# Patient Record
Sex: MALE | Race: WHITE | Employment: FULL TIME | URBAN - NONMETROPOLITAN AREA
[De-identification: names, ages, dates, MRNs, and addresses within clinical notes are randomized per-mention and may not be internally consistent; named-entity substitution may affect disease eponyms.]

---
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
## 2023-09-01 ENCOUNTER — ANESTHESIA (OUTPATIENT)
Dept: PERIOP | Facility: HOSPITAL | Age: 23
DRG: 343 | End: 2023-09-01
Payer: COMMERCIAL

## 2023-09-01 ENCOUNTER — HOSPITAL ENCOUNTER (INPATIENT)
Facility: HOSPITAL | Age: 23
LOS: 1 days | Discharge: HOME | DRG: 343 | End: 2023-09-02
Attending: SURGERY | Admitting: SURGERY
Payer: COMMERCIAL

## 2023-09-01 ENCOUNTER — ANESTHESIA EVENT (OUTPATIENT)
Dept: PERIOP | Facility: HOSPITAL | Age: 23
DRG: 343 | End: 2023-09-01
Payer: COMMERCIAL

## 2023-09-01 ENCOUNTER — APPOINTMENT (OUTPATIENT)
Dept: OTHER | Facility: HOSPITAL | Age: 23
DRG: 343 | End: 2023-09-01
Payer: COMMERCIAL

## 2023-09-01 DIAGNOSIS — K35.30 ACUTE APPENDICITIS WITH LOCALIZED PERITONITIS, UNSPECIFIED WHETHER ABSCESS PRESENT, UNSPECIFIED WHETHER GANGRENE PRESENT, UNSPECIFIED WHETHER PERFORATION PRESENT: Primary | ICD-10-CM

## 2023-09-01 DIAGNOSIS — K35.30 ACUTE APPENDICITIS WITH LOCALIZED PERITONITIS, WITHOUT PERFORATION, ABSCESS, OR GANGRENE: ICD-10-CM

## 2023-09-01 LAB
DEPRECATED RDW RBC AUTO: 40 FL (ref 37–54)
ERYTHROCYTE [DISTWIDTH] IN BLOOD BY AUTOMATED COUNT: 12.1 % (ref 12.3–15.4)
HCT VFR BLD AUTO: 50.8 % (ref 37.5–51)
HGB BLD-MCNC: 16.9 G/DL (ref 13–17.7)
MCH RBC QN AUTO: 29.9 PG (ref 26.6–33)
MCHC RBC AUTO-ENTMCNC: 33.3 G/DL (ref 31.5–35.7)
MCV RBC AUTO: 89.9 FL (ref 79–97)
PLATELET # BLD AUTO: 274 10*3/MM3 (ref 140–450)
PMV BLD AUTO: 9.5 FL (ref 6–12)
RBC # BLD AUTO: 5.65 10*6/MM3 (ref 4.14–5.8)
WBC NRBC COR # BLD: 8.2 10*3/MM3 (ref 3.4–10.8)

## 2023-09-01 PROCEDURE — 25010000002 HYDROMORPHONE PER 4 MG: Performed by: ANESTHESIOLOGY

## 2023-09-01 PROCEDURE — 25010000002 FENTANYL CITRATE (PF) 250 MCG/5ML SOLUTION: Performed by: NURSE ANESTHETIST, CERTIFIED REGISTERED

## 2023-09-01 PROCEDURE — 25010000002 PIPERACILLIN SOD-TAZOBACTAM PER 1 G: Performed by: SURGERY

## 2023-09-01 PROCEDURE — 44970 LAPAROSCOPY APPENDECTOMY: CPT | Performed by: STUDENT IN AN ORGANIZED HEALTH CARE EDUCATION/TRAINING PROGRAM

## 2023-09-01 PROCEDURE — 25010000002 DEXAMETHASONE PER 1 MG: Performed by: NURSE ANESTHETIST, CERTIFIED REGISTERED

## 2023-09-01 PROCEDURE — 99223 1ST HOSP IP/OBS HIGH 75: CPT | Performed by: STUDENT IN AN ORGANIZED HEALTH CARE EDUCATION/TRAINING PROGRAM

## 2023-09-01 PROCEDURE — 88304 TISSUE EXAM BY PATHOLOGIST: CPT | Performed by: SURGERY

## 2023-09-01 PROCEDURE — 25010000002 DROPERIDOL PER 5 MG: Performed by: ANESTHESIOLOGY

## 2023-09-01 PROCEDURE — 25010000002 HYDROMORPHONE 1 MG/ML SOLUTION: Performed by: NURSE ANESTHETIST, CERTIFIED REGISTERED

## 2023-09-01 PROCEDURE — 25010000002 PROPOFOL 10 MG/ML EMULSION: Performed by: NURSE ANESTHETIST, CERTIFIED REGISTERED

## 2023-09-01 PROCEDURE — 25010000002 ONDANSETRON PER 1 MG: Performed by: NURSE ANESTHETIST, CERTIFIED REGISTERED

## 2023-09-01 PROCEDURE — 25010000002 FENTANYL CITRATE (PF) 50 MCG/ML SOLUTION: Performed by: ANESTHESIOLOGY

## 2023-09-01 PROCEDURE — 25010000002 ENOXAPARIN PER 10 MG: Performed by: SURGERY

## 2023-09-01 PROCEDURE — 85027 COMPLETE CBC AUTOMATED: CPT | Performed by: SURGERY

## 2023-09-01 PROCEDURE — 0DTJ4ZZ RESECTION OF APPENDIX, PERCUTANEOUS ENDOSCOPIC APPROACH: ICD-10-PCS | Performed by: SURGERY

## 2023-09-01 DEVICE — THE ECHELON, ECHELON ENDOPATH™ AND ECHELON FLEX™ FAMILIES OF ENDOSCOPIC LINEAR CUTTERS AND RELOADS ARE STERILE, SINGLE PATIENT USE INSTRUMENTS THAT SIMULTANEOUSLY CUT AND STAPLE TISSUE. THERE ARE SIX STAGGERED ROWS OF STAPLES, THREE ON EITHER SIDE OF THE CUT LINE. THE 45 MM INSTRUMENTS HAVE A STAPLE LINE THATIS APPROXIMATELY 45 MM LONG AND A CUT LINE THAT IS APPROXIMATELY 42 MM LONG. THE SHAFT CAN ROTATE FREELY IN BOTH DIRECTIONS AND AN ARTICULATION MECHANISM ON ARTICULATING INSTRUMENTS ENABLES BENDING THE DISTAL PORTIONOF THE SHAFT TO FACILITATE LATERAL ACCESS OF THE OPERATIVE SITE.THE INSTRUMENTS ARE SHIPPED WITHOUT A RELOAD AND MUST BE LOADED PRIOR TO USE. A STAPLE RETAINING CAP ON THE RELOAD PROTECTS THE STAPLE LEG POINTS DURING SHIPPING AND TRANSPORTATION. THE INSTRUMENTS’ LOCK-OUT FEATURE IS DESIGNED TO PREVENT A USED RELOAD FROM BEING REFIRED.
Type: IMPLANTABLE DEVICE | Site: ABDOMEN | Status: FUNCTIONAL
Brand: ECHELON ENDOPATH

## 2023-09-01 RX ORDER — SODIUM CHLORIDE 0.9 % (FLUSH) 0.9 %
10 SYRINGE (ML) INJECTION EVERY 12 HOURS SCHEDULED
Status: DISCONTINUED | OUTPATIENT
Start: 2023-09-01 | End: 2023-09-02 | Stop reason: HOSPADM

## 2023-09-01 RX ORDER — BISACODYL 10 MG
10 SUPPOSITORY, RECTAL RECTAL DAILY PRN
Status: DISCONTINUED | OUTPATIENT
Start: 2023-09-01 | End: 2023-09-02 | Stop reason: HOSPADM

## 2023-09-01 RX ORDER — SODIUM CHLORIDE 9 MG/ML
40 INJECTION, SOLUTION INTRAVENOUS AS NEEDED
Status: DISCONTINUED | OUTPATIENT
Start: 2023-09-01 | End: 2023-09-02 | Stop reason: HOSPADM

## 2023-09-01 RX ORDER — SODIUM CHLORIDE 0.9 % (FLUSH) 0.9 %
10 SYRINGE (ML) INJECTION AS NEEDED
Status: DISCONTINUED | OUTPATIENT
Start: 2023-09-01 | End: 2023-09-02 | Stop reason: HOSPADM

## 2023-09-01 RX ORDER — PROPOFOL 10 MG/ML
VIAL (ML) INTRAVENOUS AS NEEDED
Status: DISCONTINUED | OUTPATIENT
Start: 2023-09-01 | End: 2023-09-01 | Stop reason: SURG

## 2023-09-01 RX ORDER — SODIUM CHLORIDE, SODIUM LACTATE, POTASSIUM CHLORIDE, CALCIUM CHLORIDE 600; 310; 30; 20 MG/100ML; MG/100ML; MG/100ML; MG/100ML
INJECTION, SOLUTION INTRAVENOUS CONTINUOUS PRN
Status: DISCONTINUED | OUTPATIENT
Start: 2023-09-01 | End: 2023-09-01 | Stop reason: SURG

## 2023-09-01 RX ORDER — ACETAMINOPHEN 325 MG/1
650 TABLET ORAL EVERY 6 HOURS
Status: DISCONTINUED | OUTPATIENT
Start: 2023-09-01 | End: 2023-09-02 | Stop reason: HOSPADM

## 2023-09-01 RX ORDER — NEOSTIGMINE METHYLSULFATE 5 MG/5 ML
SYRINGE (ML) INTRAVENOUS AS NEEDED
Status: DISCONTINUED | OUTPATIENT
Start: 2023-09-01 | End: 2023-09-01 | Stop reason: SURG

## 2023-09-01 RX ORDER — SODIUM CHLORIDE, SODIUM LACTATE, POTASSIUM CHLORIDE, CALCIUM CHLORIDE 600; 310; 30; 20 MG/100ML; MG/100ML; MG/100ML; MG/100ML
100 INJECTION, SOLUTION INTRAVENOUS CONTINUOUS
Status: CANCELLED | OUTPATIENT
Start: 2023-09-01

## 2023-09-01 RX ORDER — BISACODYL 5 MG/1
5 TABLET, DELAYED RELEASE ORAL DAILY PRN
Status: DISCONTINUED | OUTPATIENT
Start: 2023-09-01 | End: 2023-09-02 | Stop reason: HOSPADM

## 2023-09-01 RX ORDER — SODIUM CHLORIDE 9 MG/ML
40 INJECTION, SOLUTION INTRAVENOUS AS NEEDED
Status: CANCELLED | OUTPATIENT
Start: 2023-09-01

## 2023-09-01 RX ORDER — MAGNESIUM HYDROXIDE 1200 MG/15ML
LIQUID ORAL AS NEEDED
Status: DISCONTINUED | OUTPATIENT
Start: 2023-09-01 | End: 2023-09-01 | Stop reason: HOSPADM

## 2023-09-01 RX ORDER — ENOXAPARIN SODIUM 100 MG/ML
40 INJECTION SUBCUTANEOUS DAILY
Status: DISCONTINUED | OUTPATIENT
Start: 2023-09-01 | End: 2023-09-02 | Stop reason: HOSPADM

## 2023-09-01 RX ORDER — LABETALOL HYDROCHLORIDE 5 MG/ML
5 INJECTION, SOLUTION INTRAVENOUS
Status: DISCONTINUED | OUTPATIENT
Start: 2023-09-01 | End: 2023-09-01 | Stop reason: HOSPADM

## 2023-09-01 RX ORDER — DROPERIDOL 2.5 MG/ML
0.62 INJECTION, SOLUTION INTRAMUSCULAR; INTRAVENOUS ONCE AS NEEDED
Status: COMPLETED | OUTPATIENT
Start: 2023-09-01 | End: 2023-09-01

## 2023-09-01 RX ORDER — SODIUM CHLORIDE 0.9 % (FLUSH) 0.9 %
3-10 SYRINGE (ML) INJECTION AS NEEDED
Status: CANCELLED | OUTPATIENT
Start: 2023-09-01

## 2023-09-01 RX ORDER — LIDOCAINE HYDROCHLORIDE 40 MG/ML
SOLUTION TOPICAL AS NEEDED
Status: DISCONTINUED | OUTPATIENT
Start: 2023-09-01 | End: 2023-09-01 | Stop reason: SURG

## 2023-09-01 RX ORDER — BUPIVACAINE HYDROCHLORIDE AND EPINEPHRINE 5; 5 MG/ML; UG/ML
INJECTION, SOLUTION PERINEURAL AS NEEDED
Status: DISCONTINUED | OUTPATIENT
Start: 2023-09-01 | End: 2023-09-01 | Stop reason: HOSPADM

## 2023-09-01 RX ORDER — SODIUM CHLORIDE 0.9 % (FLUSH) 0.9 %
3 SYRINGE (ML) INJECTION EVERY 12 HOURS SCHEDULED
Status: CANCELLED | OUTPATIENT
Start: 2023-09-01

## 2023-09-01 RX ORDER — FENTANYL CITRATE 50 UG/ML
25 INJECTION, SOLUTION INTRAMUSCULAR; INTRAVENOUS
Status: DISCONTINUED | OUTPATIENT
Start: 2023-09-01 | End: 2023-09-01 | Stop reason: HOSPADM

## 2023-09-01 RX ORDER — HYDROMORPHONE HYDROCHLORIDE 1 MG/ML
0.5 INJECTION, SOLUTION INTRAMUSCULAR; INTRAVENOUS; SUBCUTANEOUS
Status: DISCONTINUED | OUTPATIENT
Start: 2023-09-01 | End: 2023-09-01 | Stop reason: HOSPADM

## 2023-09-01 RX ORDER — POLYETHYLENE GLYCOL 3350 17 G/17G
17 POWDER, FOR SOLUTION ORAL DAILY PRN
Status: DISCONTINUED | OUTPATIENT
Start: 2023-09-01 | End: 2023-09-02 | Stop reason: HOSPADM

## 2023-09-01 RX ORDER — DEXTROSE, SODIUM CHLORIDE, SODIUM LACTATE, POTASSIUM CHLORIDE, AND CALCIUM CHLORIDE 5; .6; .31; .03; .02 G/100ML; G/100ML; G/100ML; G/100ML; G/100ML
50 INJECTION, SOLUTION INTRAVENOUS CONTINUOUS
Status: DISCONTINUED | OUTPATIENT
Start: 2023-09-01 | End: 2023-09-02

## 2023-09-01 RX ORDER — DEXAMETHASONE SODIUM PHOSPHATE 4 MG/ML
INJECTION, SOLUTION INTRA-ARTICULAR; INTRALESIONAL; INTRAMUSCULAR; INTRAVENOUS; SOFT TISSUE AS NEEDED
Status: DISCONTINUED | OUTPATIENT
Start: 2023-09-01 | End: 2023-09-01 | Stop reason: SURG

## 2023-09-01 RX ORDER — ONDANSETRON 2 MG/ML
INJECTION INTRAMUSCULAR; INTRAVENOUS AS NEEDED
Status: DISCONTINUED | OUTPATIENT
Start: 2023-09-01 | End: 2023-09-01 | Stop reason: SURG

## 2023-09-01 RX ORDER — ONDANSETRON 2 MG/ML
4 INJECTION INTRAMUSCULAR; INTRAVENOUS EVERY 6 HOURS PRN
Status: DISCONTINUED | OUTPATIENT
Start: 2023-09-01 | End: 2023-09-02

## 2023-09-01 RX ORDER — AMOXICILLIN 250 MG
2 CAPSULE ORAL 2 TIMES DAILY
Status: DISCONTINUED | OUTPATIENT
Start: 2023-09-01 | End: 2023-09-02 | Stop reason: HOSPADM

## 2023-09-01 RX ORDER — TRAMADOL HYDROCHLORIDE 50 MG/1
50 TABLET ORAL EVERY 6 HOURS PRN
Status: DISCONTINUED | OUTPATIENT
Start: 2023-09-01 | End: 2023-09-02 | Stop reason: HOSPADM

## 2023-09-01 RX ORDER — OXYCODONE AND ACETAMINOPHEN 10; 325 MG/1; MG/1
1 TABLET ORAL ONCE AS NEEDED
Status: COMPLETED | OUTPATIENT
Start: 2023-09-01 | End: 2023-09-01

## 2023-09-01 RX ORDER — ONDANSETRON 2 MG/ML
4 INJECTION INTRAMUSCULAR; INTRAVENOUS
Status: DISCONTINUED | OUTPATIENT
Start: 2023-09-01 | End: 2023-09-01 | Stop reason: HOSPADM

## 2023-09-01 RX ORDER — NALOXONE HCL 0.4 MG/ML
0.04 VIAL (ML) INJECTION AS NEEDED
Status: DISCONTINUED | OUTPATIENT
Start: 2023-09-01 | End: 2023-09-01 | Stop reason: HOSPADM

## 2023-09-01 RX ORDER — LIDOCAINE HYDROCHLORIDE 20 MG/ML
INJECTION, SOLUTION EPIDURAL; INFILTRATION; INTRACAUDAL; PERINEURAL AS NEEDED
Status: DISCONTINUED | OUTPATIENT
Start: 2023-09-01 | End: 2023-09-01 | Stop reason: SURG

## 2023-09-01 RX ORDER — FLUMAZENIL 0.1 MG/ML
0.2 INJECTION INTRAVENOUS AS NEEDED
Status: DISCONTINUED | OUTPATIENT
Start: 2023-09-01 | End: 2023-09-01 | Stop reason: HOSPADM

## 2023-09-01 RX ORDER — ACETAMINOPHEN 500 MG
1000 TABLET ORAL ONCE
Status: CANCELLED | OUTPATIENT
Start: 2023-09-01 | End: 2023-09-01

## 2023-09-01 RX ORDER — MIDAZOLAM HYDROCHLORIDE 1 MG/ML
1 INJECTION INTRAMUSCULAR; INTRAVENOUS
Status: CANCELLED | OUTPATIENT
Start: 2023-09-01

## 2023-09-01 RX ORDER — SODIUM CHLORIDE 9 MG/ML
INJECTION, SOLUTION INTRAVENOUS AS NEEDED
Status: DISCONTINUED | OUTPATIENT
Start: 2023-09-01 | End: 2023-09-01 | Stop reason: HOSPADM

## 2023-09-01 RX ORDER — ROCURONIUM BROMIDE 10 MG/ML
INJECTION, SOLUTION INTRAVENOUS AS NEEDED
Status: DISCONTINUED | OUTPATIENT
Start: 2023-09-01 | End: 2023-09-01 | Stop reason: SURG

## 2023-09-01 RX ORDER — FENTANYL CITRATE 50 UG/ML
INJECTION, SOLUTION INTRAMUSCULAR; INTRAVENOUS AS NEEDED
Status: DISCONTINUED | OUTPATIENT
Start: 2023-09-01 | End: 2023-09-01 | Stop reason: SURG

## 2023-09-01 RX ADMIN — SODIUM CHLORIDE, SODIUM LACTATE, POTASSIUM CHLORIDE, CALCIUM CHLORIDE AND DEXTROSE MONOHYDRATE 75 ML/HR: 5; 600; 310; 30; 20 INJECTION, SOLUTION INTRAVENOUS at 18:28

## 2023-09-01 RX ADMIN — GLYCOPYRROLATE 0.4 MG: 0.2 INJECTION INTRAMUSCULAR; INTRAVENOUS at 21:10

## 2023-09-01 RX ADMIN — SODIUM CHLORIDE, POTASSIUM CHLORIDE, SODIUM LACTATE AND CALCIUM CHLORIDE: 600; 310; 30; 20 INJECTION, SOLUTION INTRAVENOUS at 21:05

## 2023-09-01 RX ADMIN — FENTANYL CITRATE 25 MCG: 50 INJECTION, SOLUTION INTRAMUSCULAR; INTRAVENOUS at 21:58

## 2023-09-01 RX ADMIN — DROPERIDOL 0.62 MG: 2.5 INJECTION, SOLUTION INTRAMUSCULAR; INTRAVENOUS at 21:54

## 2023-09-01 RX ADMIN — HYDROMORPHONE HYDROCHLORIDE 0.5 MG: 1 INJECTION, SOLUTION INTRAMUSCULAR; INTRAVENOUS; SUBCUTANEOUS at 21:52

## 2023-09-01 RX ADMIN — FENTANYL CITRATE 100 MCG: 0.05 INJECTION, SOLUTION INTRAMUSCULAR; INTRAVENOUS at 20:29

## 2023-09-01 RX ADMIN — SODIUM CHLORIDE, POTASSIUM CHLORIDE, SODIUM LACTATE AND CALCIUM CHLORIDE: 600; 310; 30; 20 INJECTION, SOLUTION INTRAVENOUS at 20:11

## 2023-09-01 RX ADMIN — ROCURONIUM BROMIDE 50 MG: 10 INJECTION INTRAVENOUS at 20:14

## 2023-09-01 RX ADMIN — DEXAMETHASONE SODIUM PHOSPHATE 8 MG: 4 INJECTION, SOLUTION INTRA-ARTICULAR; INTRALESIONAL; INTRAMUSCULAR; INTRAVENOUS; SOFT TISSUE at 20:22

## 2023-09-01 RX ADMIN — FENTANYL CITRATE 25 MCG: 50 INJECTION, SOLUTION INTRAMUSCULAR; INTRAVENOUS at 21:53

## 2023-09-01 RX ADMIN — ONDANSETRON 4 MG: 2 INJECTION INTRAMUSCULAR; INTRAVENOUS at 20:56

## 2023-09-01 RX ADMIN — HYDROMORPHONE HYDROCHLORIDE 1 MG: 1 INJECTION, SOLUTION INTRAMUSCULAR; INTRAVENOUS; SUBCUTANEOUS at 21:15

## 2023-09-01 RX ADMIN — Medication 3 MG: at 21:10

## 2023-09-01 RX ADMIN — LIDOCAINE HYDROCHLORIDE 100 MG: 20 INJECTION, SOLUTION EPIDURAL; INFILTRATION; INTRACAUDAL; PERINEURAL at 20:14

## 2023-09-01 RX ADMIN — FENTANYL CITRATE 150 MCG: 0.05 INJECTION, SOLUTION INTRAMUSCULAR; INTRAVENOUS at 20:11

## 2023-09-01 RX ADMIN — PROPOFOL 200 MG: 10 INJECTION, EMULSION INTRAVENOUS at 20:14

## 2023-09-01 RX ADMIN — PIPERACILLIN SODIUM AND TAZOBACTAM SODIUM 3.38 G: 3; .375 INJECTION, SOLUTION INTRAVENOUS at 18:58

## 2023-09-01 RX ADMIN — ENOXAPARIN SODIUM 40 MG: 100 INJECTION SUBCUTANEOUS at 23:16

## 2023-09-01 RX ADMIN — LIDOCAINE HYDROCHLORIDE 1 EACH: 40 SOLUTION TOPICAL at 20:15

## 2023-09-01 RX ADMIN — OXYCODONE AND ACETAMINOPHEN 1 TABLET: 10; 325 TABLET ORAL at 22:00

## 2023-09-01 NOTE — PROGRESS NOTES
Pharmacy Recommendation  Antimicrobial Stewardship  Initiation     Assessment/Action/Plan:  Pharmacy to dose Zosyn for treatment of IAI. Therapy initiated at 3.375 g  every 8 hours (extended infusion) x 4 days.      Subjective:   Farrukh Simental is a 23 y.o. male currently being treated for IAI.    Objective:  No results found for: WBC  Temp Readings from Last 1 Encounters:   09/01/23 97.5 °F (36.4 °C) (Oral)     Culture Results:  Microbiology Results (last 10 days)       ** No results found for the last 240 hours. **          Current Antimicrobials:   Anti-Infectives (From admission, onward)      Ordered     Dose/Rate Route Frequency Start Stop    09/01/23 1834  piperacillin-tazobactam (ZOSYN) 3.375 g in iso-osmotic dextrose 50 ml (premix)        Ordering Provider: Yobany Bullard Jr., MD    3.375 g  over 4 Hours Intravenous Every 8 Hours 09/02/23 0130 09/06/23 0129    09/01/23 1834  piperacillin-tazobactam (ZOSYN) 3.375 g in iso-osmotic dextrose 50 ml (premix)        Ordering Provider: Yobany Bullard Jr., MD    3.375 g  over 30 Minutes Intravenous Once 09/01/23 1930      09/01/23 1832  Pharmacy to Dose Zosyn        Ordering Provider: Yobany Bullard Jr., MD     Does not apply Continuous PRN 09/01/23 1831 09/05/23 1830            Ricky Sullivan, [place]  09/01/23 18:38 CDT

## 2023-09-01 NOTE — PLAN OF CARE
Goal Outcome Evaluation:         Pt here with appendicitis.  Grandmother at bedside.  Transfer from [place]. IVF infusing. Pt oriented to room.

## 2023-09-02 VITALS
TEMPERATURE: 97.8 F | HEART RATE: 50 BPM | RESPIRATION RATE: 16 BRPM | HEIGHT: 72 IN | BODY MASS INDEX: 24.11 KG/M2 | DIASTOLIC BLOOD PRESSURE: 48 MMHG | SYSTOLIC BLOOD PRESSURE: 105 MMHG | WEIGHT: 178 LBS | OXYGEN SATURATION: 98 %

## 2023-09-02 PROCEDURE — 99024 POSTOP FOLLOW-UP VISIT: CPT | Performed by: STUDENT IN AN ORGANIZED HEALTH CARE EDUCATION/TRAINING PROGRAM

## 2023-09-02 PROCEDURE — 25010000002 ENOXAPARIN PER 10 MG: Performed by: SURGERY

## 2023-09-02 RX ORDER — TRAMADOL HYDROCHLORIDE 50 MG/1
50 TABLET ORAL EVERY 6 HOURS PRN
Qty: 12 TABLET | Refills: 0 | Status: SHIPPED | OUTPATIENT
Start: 2023-09-02 | End: 2023-09-08

## 2023-09-02 RX ADMIN — Medication 10 ML: at 08:38

## 2023-09-02 RX ADMIN — ACETAMINOPHEN 650 MG: 325 TABLET, FILM COATED ORAL at 06:08

## 2023-09-02 RX ADMIN — ACETAMINOPHEN 650 MG: 325 TABLET, FILM COATED ORAL at 00:44

## 2023-09-02 RX ADMIN — TRAMADOL HYDROCHLORIDE 50 MG: 50 TABLET, COATED ORAL at 03:40

## 2023-09-02 RX ADMIN — TRAMADOL HYDROCHLORIDE 50 MG: 50 TABLET, COATED ORAL at 09:57

## 2023-09-02 RX ADMIN — ENOXAPARIN SODIUM 40 MG: 100 INJECTION SUBCUTANEOUS at 08:38

## 2023-09-02 RX ADMIN — DOCUSATE SODIUM 50 MG AND SENNOSIDES 8.6 MG 2 TABLET: 8.6; 5 TABLET, FILM COATED ORAL at 08:38

## 2023-09-02 NOTE — PLAN OF CARE
Goal Outcome Evaluation:  Plan of Care Reviewed With: patient      Patient c/o abdominal pain, PRN pain med given. No c/o nausea. Lap sites x4 c/d/I with liquid skin adhesive. IVF infusing. Pt on room air, continuous pulse ox in use. A&O x4. Voiding per urinal. VSS.

## 2023-09-02 NOTE — ANESTHESIA PREPROCEDURE EVALUATION
Anesthesia Evaluation     Patient summary reviewed and Nursing notes reviewed   no history of anesthetic complications:   NPO Solid Status: > 8 hours             Airway   Mallampati: I  Dental      Pulmonary    (+) a smoker Current,  (-) sleep apnea, no home oxygen  Cardiovascular - negative cardio ROS        Neuro/Psych  (-) seizures, CVA  GI/Hepatic/Renal/Endo    (-) diabetes    Musculoskeletal     Abdominal    Substance History      OB/GYN          Other                        Anesthesia Plan    ASA 1 - emergent     general     intravenous induction     Anesthetic plan, risks, benefits, and alternatives have been provided, discussed and informed consent has been obtained with: patient.      CODE STATUS:    Level Of Support Discussed With: Patient; Health Care Surrogate  Code Status (Patient has no pulse and is not breathing): CPR (Attempt to Resuscitate)  Medical Interventions (Patient has pulse or is breathing): Full Support

## 2023-09-02 NOTE — H&P
Patient Name:  Farrukh Simental  YOB: 2000  [id number]    Date of Service: 9/1/2023       Patient Care Team:  Samuel Brady DO as PCP - General (Family Medicine)      General Surgery History and Physical    Date: 09/01/23    Chief Complaint : Acute appendicitis    Subjective      Patient is a 23 y.o. male who presents as a transfer from the referring clinic given concern for acute appendicitis.  23-year-old otherwise healthy and very active man presents with about a 1-1/2-day duration of vague abdominal pain with subsequent migration to the right lower quadrant.  He has associated symptoms of nausea, anorexia, some diarrhea, and dysuria.  He was seen by side clinic who obtained labs in which there was reportedly no leukocytosis but CT abdomen pelvis obtained was notable for acute appendicitis with some fluid in the pelvis.  He was then transferred out to [facility] for a laparoscopic appendectomy.    CT abdomen/pelvis personally reviewed and discussed with in-house radiologist: Patient has a acute appendicitis in the right lower quadrant with likely reactive fluid in the pelvis without evidence of obvious perforation.    Allergy: No Known Allergies    Medications:  acetaminophen, 650 mg, Oral, Q6H  enoxaparin, 40 mg, Subcutaneous, Daily  piperacillin-tazobactam, 3.375 g, Intravenous, Once  [START ON 9/2/2023] piperacillin-tazobactam, 3.375 g, Intravenous, Q8H  senna-docusate sodium, 2 tablet, Oral, BID  sodium chloride, 10 mL, Intravenous, Q12H      dextrose 5 % and lactated Ringer's, 75 mL/hr, Last Rate: 75 mL/hr (09/01/23 1828)  Pharmacy to Dose Zosyn,       No current facility-administered medications on file prior to encounter.     No current outpatient medications on file prior to encounter.       PMHx:   No past medical history on file.      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    Tobacco use: Denies, but vapes   EtOH use : Denies    Illicit drug use: Denies      Review of Systems   Pertinent items are noted in HPI     Constitutional: See HPI   Eyes: Denies visual changes    Cardiovascular: Denies chest pain, palpitations   Pulmonary: Denies cough or shortness of breath   Abdominal/ GI: See HPI    Genitourinary: Denies dysuria or hematuria   Musculoskeletal: Denies any but chronic joint aches, pains or deformities   Psychiatric: No recent mood changes   Neurologic: No paresthesias or loss of function          Objective     Physical Exam:      Vital Signs  /74 (BP Location: Right arm)   Pulse 85   Temp 97.5 °F (36.4 °C) (Oral)   Resp 18   Ht 182.9 cm (72\")   Wt 80.7 kg (178 lb)   SpO2 100%   BMI 24.14 kg/m²   No intake or output data in the 24 hours ending 09/01/23 1907      Physical Exam:    Head: Normocephalic, atraumatic.   Eyes: Pupils equal, round, react to light and accommodation.   Mouth: Oral mucosa without lesions,   Neck: No masses, lymphadenopathy or carotid bruits bilaterally   CV: Rhythm regular and rate regular, no murmurs, rubs or gallops  Lungs: Clear to auscultation bilaterally   Abdomen: Bowel sounds positive, soft, lower quadrant tenderness to palpation, positive Rovsing sign, scopic midline incisional scars  Groin : No obvious hernias bilaterally   Extremities:  No cyanosis, clubbing or edema bilaterally   Lymphatics: No abnormal lymphadenopathy appreciated   Neurologic: No gross deficits       Results Review:   I have personally reviewed all of the recent lab and imaging results available at this time   WBC 8.2       Assessment & Plan     Assessment and Plan:    Problem List Items Addressed This Visit          Gastrointestinal Abdominal     * (Principal) Acute appendicitis with localized peritonitis - Primary    Relevant Orders    Case request (Completed)        Active Hospital Problems "    Diagnosis  POA    **Acute appendicitis with localized peritonitis [K35.30]  Yes      Resolved Hospital Problems   No resolved problems to display.      23-year-old man with history of prior laparoscopic resection of gastric tumor at age is old, presenting with classic signs and symptoms of acute appendicitis without obvious signs of perforation    P.o. remains IV fluids  IV antibiotics  Multimodal pain control  2 OR for laparoscopic, possible open appendectomy  Risk, benefits, and alternatives were discussed with the patient.  He and his grandmother noted their understanding consent to proceed to the OR.    I discussed the patient's findings and my recommendations with the patient and/or family, as well as the primary team     Yobany Bullard Jr, MD  09/01/23  19:07 CDT

## 2023-09-02 NOTE — OP NOTE
OPERATIVE NOTE    Patient Name:  Farrukh Simental  YOB: 2000, [id number]    Date of Surgery:  9/1/2023    PREOPERATIVE DIAGNOSIS: Acute appendicitis with localized peritonitis, unspecified whether abscess present, unspecified whether gangrene present, unspecified whether perforation present [K35.30]    POSTOPERATIVE DIAGNOSIS: Same      PROCEDURE PERFORMED:   1] Laparoscopic appendectomy     SURGEON: Yobany Bullard Jr., MD MS    Circulator: Shasha Hodgson RN  Scrub Person: Essie Seymour  Assistant: Rebeca Strogn     SPECIMENS: Appendix     ANESTHESIA: General     FINDINGS:   1.  Acute noncomplicated appendicitis without evidence of gangrene or perforation     INDICATIONS: The patient is a 23 y.o. male who presented with 1.5-day duration of vague abdominal pain with migration to the right lower quadrant.  Work-up by a referring physician had found him to have an acute appendicitis with pelvic fluid.  There is concern for potential acute appendicitis with perforation so he is transferred to [facility].  Risk, benefits, and alternatives were discussed with the patient and his grandmother.  They noted their understanding and consent to proceed to the OR for laparoscopic, possible open, appendectomy.     DESCRIPTION OF PROCEDURE:    After obtaining informed consent, the patient was taken to the operating room and placed in supine position the right arm out and left arm tucked. After appropriate DVT and antibiotic prophylaxis, general anesthesia was induced.  A Trinidad was placed in sterilely. The abdomen was prepped and draped in standard sterile fashion.  Timeout was properly performed.    At Bowen's point, local anesthetic was given, followed by a small incision using 11 blade, we then placed a Veress needle successfully and a water drop test was performed and was noted to be satisfactorily.  Then insufflated the abdomen and the patient tolerated the insufflation  without issues.    A local anesthetic infraumbilically followed by incision of the skin with the 11 blade.  Using the Optiview technique, we gained access into the abdominal cavity.  Of note, it was difficult to perform the Optiview technique given the patient's strong fascia.  We viewed the underlying structures and no injuries were noted.  We evaluated Bowen's point and there is no iatrogenic injury with the placement of the Veress needle.  The Veress needle was removed.  Two 5 mm ports were then placed in the left lower quadrant and suprapubic area.  The infraumbilical port was then upsized from a 5 mm port to a 12 mm port.  The patient's right side was then elevated up.    We triangulated to the right lower quadrant.  Found the right colon and traced tinea down to the cecal base which we then found the appendix.  We mobilized the appendix up.  We dissected the base of the appendiceal mesoappendix in order to create a window.  We transected the appendiceal base using a blue 45 mm stapler load.  We then transected the mesoappendix using 2 white 45 mm stapler loads.  The appendix was then placed into an Endo Catch bag and removed through the infraumbilical port site.    We turned our attention back to the stable sites: Remnant appendiceal base was intact without evidence of leak, the remnant mesoappendix was noted to be hemostatic.  We placed the patient in Trendelenburg position.  We evaluated the pelvis colon and had reactionary fluid without any evidence of abscess.  We suctioned out the fluid.    Using the PMI suture passer, we closed the infraumbilical 12 mm port site fascia in a figure-of-eight fashion using 0 Vicryl suture.  The placed patient in supine position.  We removed the 5 mm port under direct laparoscopic vision.  We then desufflated the abdominal cavity.  We irrigated all of the incision sites.  We closed the infraumbilical incision in a 2 layer fashion using interrupted 3-0 Vicryl sutures and  running 3-0 Monocryl sutures.  We closed the 5 mm ports using 3-0 interrupted Monocryl sutures.  Skin glue was placed on top of all of the incision sites.    All sponge and needle counts were correct times two at the completion of the procedure. The patient recovered from anesthesia, was extubated in the operating room and was transported to the PACU in stable condition.    Assistant: Rebeca Strong  was responsible for performing the following activities: Retraction, Suturing, and Closing and their skilled assistance was necessary for the success of this case.    Yobany Bullard Jr, MD  9/1/2023  21:55 CDT

## 2023-09-02 NOTE — DISCHARGE INSTRUCTIONS
Diet: As tolerated  Pain control: OTC Tylenol and ibuprofen as needed, prescription for tramadol as needed  Shower: Can shower today  Activity: No lifting greater than 10 pounds x 4 weeks, light duties at work for 4 weeks  Call: If Tmax greater than 101.5, worsening right lower quadrant pain, wound erythema

## 2023-09-02 NOTE — ANESTHESIA POSTPROCEDURE EVALUATION
"Patient: Farrukh Simental    Procedure Summary       Date: 09/01/23 Room / Location:  [facility] OR  /  [facility]    Anesthesia Start: 2011 Anesthesia Stop: 2134    Procedure: APPENDECTOMY LAPAROSCOPIC (Abdomen) Diagnosis:       Acute appendicitis with localized peritonitis, unspecified whether abscess present, unspecified whether gangrene present, unspecified whether perforation present      (Acute appendicitis with localized peritonitis, unspecified whether abscess present, unspecified whether gangrene present, unspecified whether perforation present [K35.30])    Surgeons: Yobany Bullard Jr., MD Provider: Dillon Klein CRNA    Anesthesia Type: general ASA Status: 1 - Emergent            Anesthesia Type: general    Vitals  Vitals Value Taken Time   /65 09/01/23 2225   Temp 98.1 øF (36.7 øC) 09/01/23 2225   Pulse 72 09/01/23 2230   Resp 14 09/01/23 2225   SpO2 94 % 09/01/23 2230   Vitals shown include unvalidated device data.        Post Anesthesia Care and Evaluation    Patient location during evaluation: PACU  Patient participation: complete - patient participated  Level of consciousness: awake and alert  Pain management: adequate    Airway patency: patent  Anesthetic complications: No anesthetic complications  PONV Status: controlled  Cardiovascular status: acceptable  Respiratory status: acceptable  Hydration status: acceptable    Comments: Blood pressure 133/69, pulse 90, temperature 98 øF (36.7 øC), temperature source Oral, resp. rate 16, height 182.9 cm (72\"), weight 80.7 kg (178 lb), SpO2 98 %.    Pt discharged from PACU based on padilla score >8    "

## 2023-09-02 NOTE — ANESTHESIA PROCEDURE NOTES
Airway  Urgency: elective    Date/Time: 9/1/2023 8:16 PM  Airway not difficult    General Information and Staff    Patient location during procedure: OR  CRNA/CAA: Dillon Klein CRNA    Indications and Patient Condition  Indications for airway management: airway protection    Preoxygenated: yes  Mask difficulty assessment: 1 - vent by mask    Final Airway Details  Final airway type: endotracheal airway      Successful airway: ETT  Cuffed: yes   Successful intubation technique: direct laryngoscopy  Endotracheal tube insertion site: oral  Blade: Shepherd  Blade size: 2  ETT size (mm): 7.5  Cormack-Lehane Classification: grade I - full view of glottis  Placement verified by: chest auscultation and capnometry   Cuff volume (mL): 8  Measured from: lips  ETT/EBT  to lips (cm): 22  Number of attempts at approach: 1  Assessment: lips, teeth, and gum same as pre-op and atraumatic intubation

## 2023-09-02 NOTE — DISCHARGE SUMMARY
Discharge Summary    Patient Name:  Farrukh Simental  YOB: 2000  [id number]    DATE OF ADMISSION: 9/1/2023    DATE OF DISCHARGE: 9/2/2023     FINAL DIAGNOSES:   Patient Active Problem List   Diagnosis    Acute appendicitis with localized peritonitis       CONSULTING SERVICES: None      PROCEDURES PERFORMED:   1.Laparoscopic appendectomy    HISTORY:    Patient is a 23 y.o. male who presents as a transfer from the referring clinic given concern for acute appendicitis.  23-year-old otherwise healthy and very active man presents with about a 1-1/2-day duration of vague abdominal pain with subsequent migration to the right lower quadrant.  He has associated symptoms of nausea, anorexia, some diarrhea, and dysuria.  He was seen by side clinic who obtained labs in which there was reportedly no leukocytosis but CT abdomen pelvis obtained was notable for acute appendicitis with some fluid in the pelvis.  He was then transferred out to [facility] for a laparoscopic appendectomy.     CT abdomen/pelvis personally reviewed and discussed with in-house radiologist: Patient has a acute appendicitis in the right lower quadrant with likely reactive fluid in the pelvis without evidence of obvious perforation.     HOSPITAL COURSE:   Patient was admitted to the general surgery service.  CD of the CT scan was uploaded and personally reviewed as well with the on-call radiologist: It was confirmed that there is a acute appendicitis with appendix projecting into the right lower pelvis.  No obvious perforation.    He underwent laparoscopic appendectomy on 9/1/2023.  He tolerated procedure well.  He was found to have a nonperforated acute appendicitis.  Postoperatively, patient was doing well.  He tolerated p.o. intake without any nausea or vomiting.  He had adequate pain control.  He was ambulatory and passing flatus.  He was voiding without issues.    He benefited from the hospital stay.  He was deemed medically  stable for discharge home.     DISCHARGE MEDICATIONS:   1. All previous home medications.   2. Tramadol 50mg PO  Q6h  PRN pain     DISCHARGE INSTRUCTIONS:  Diet: As tolerated  Pain control: OTC Tylenol and ibuprofen as needed, prescription for tramadol as needed  Shower: Can shower today  Activity: No lifting greater than 10 pounds x 4 weeks, light duties at work for 4 weeks  Call: If Tmax greater than 101.5, worsening right lower quadrant pain, wound erythema    Yobany Bullard Jr, MD  9/2/2023  09:59 CDT

## 2023-09-05 NOTE — PAYOR COMM NOTE
"Venus Simental (23 y.o. Male)   ATTN JEANMARIE   admit 9/1 d/c 9/2   [facility] phone    fax          Date of Birth   2000    Social Security Number       Address   [address] [facility] [address]    Home Phone       MRN   [id number]       Mosque   [employer]    Marital Status   Single                            Admission Date   9/1/23    Admission Type   Urgent    Admitting Provider   Yobany Bullard Jr., MD    Attending Provider       Department, Room/Bed   [facility] 3C, 388/1       Discharge Date   9/2/2023    Discharge Disposition   Home or Self Care    Discharge Destination                                 Attending Provider: (none)   Allergies: No Known Allergies    Isolation: None   Infection: None   Code Status: Prior    Ht: 182.9 cm (72\")   Wt: 80.7 kg (178 lb)    Admission Cmt: None   Principal Problem: Acute appendicitis with localized peritonitis [K35.30]                   Active Insurance as of 9/1/2023       Primary Coverage       Payor Plan Insurance Group Employer/Plan Group    MISC MULTI PLAN [id number]       Coverage Address Coverage Phone Number Coverage Fax Number Effective Dates    [id number] [phone]  1/1/2023 - None Entered    [place] [address]         Subscriber Name Subscriber Birth Date Member ID       VENUS SIMENTAL 2000 [id number]                     Emergency Contacts        (Rel.) Home Phone Work Phone Mobile Phone    Giuliano BARNARD (Step Parent) -- -- [phone]                 History & Physical        Yobany Bullard Jr., MD at 09/01/23 1906          Patient Name:  Venus Simental  YOB: 2000  [id number]    Date of Service: 9/1/2023       Patient Care Team:  Samuel Brady DO as PCP - General (Family Medicine)      General Surgery History and Physical    Date: 09/01/23    Chief Complaint : Acute " appendicitis    Subjective     Patient is a 23 y.o. male who presents as a transfer from the referring clinic given concern for acute appendicitis.  23-year-old otherwise healthy and very active man presents with about a 1-1/2-day duration of vague abdominal pain with subsequent migration to the right lower quadrant.  He has associated symptoms of nausea, anorexia, some diarrhea, and dysuria.  He was seen by side clinic who obtained labs in which there was reportedly no leukocytosis but CT abdomen pelvis obtained was notable for acute appendicitis with some fluid in the pelvis.  He was then transferred out to [facility] for a laparoscopic appendectomy.    CT abdomen/pelvis personally reviewed and discussed with in-house radiologist: Patient has a acute appendicitis in the right lower quadrant with likely reactive fluid in the pelvis without evidence of obvious perforation.    Allergy: No Known Allergies    Medications:  acetaminophen, 650 mg, Oral, Q6H  enoxaparin, 40 mg, Subcutaneous, Daily  piperacillin-tazobactam, 3.375 g, Intravenous, Once  [START ON 9/2/2023] piperacillin-tazobactam, 3.375 g, Intravenous, Q8H  senna-docusate sodium, 2 tablet, Oral, BID  sodium chloride, 10 mL, Intravenous, Q12H      dextrose 5 % and lactated Ringer's, 75 mL/hr, Last Rate: 75 mL/hr (09/01/23 1828)  Pharmacy to Dose Zosyn,       No current facility-administered medications on file prior to encounter.     No current outpatient medications on file prior to encounter.       PMHx:   No past medical history on file.      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    Tobacco use: Denies, but vapes   EtOH use : Denies    Illicit drug use: Denies      Review of Systems   Pertinent items are noted in HPI     Constitutional: See HPI   Eyes: Denies visual changes  "   Cardiovascular: Denies chest pain, palpitations   Pulmonary: Denies cough or shortness of breath   Abdominal/ GI: See HPI    Genitourinary: Denies dysuria or hematuria   Musculoskeletal: Denies any but chronic joint aches, pains or deformities   Psychiatric: No recent mood changes   Neurologic: No paresthesias or loss of function          Objective    Physical Exam:      Vital Signs  /74 (BP Location: Right arm)   Pulse 85   Temp 97.5 °F (36.4 °C) (Oral)   Resp 18   Ht 182.9 cm (72\")   Wt 80.7 kg (178 lb)   SpO2 100%   BMI 24.14 kg/m²   No intake or output data in the 24 hours ending 09/01/23 1907      Physical Exam:    Head: Normocephalic, atraumatic.   Eyes: Pupils equal, round, react to light and accommodation.   Mouth: Oral mucosa without lesions,   Neck: No masses, lymphadenopathy or carotid bruits bilaterally   CV: Rhythm regular and rate regular, no murmurs, rubs or gallops  Lungs: Clear to auscultation bilaterally   Abdomen: Bowel sounds positive, soft, lower quadrant tenderness to palpation, positive Rovsing sign, scopic midline incisional scars  Groin : No obvious hernias bilaterally   Extremities:  No cyanosis, clubbing or edema bilaterally   Lymphatics: No abnormal lymphadenopathy appreciated   Neurologic: No gross deficits       Results Review:   I have personally reviewed all of the recent lab and imaging results available at this time   WBC 8.2       Assessment & Plan    Assessment and Plan:    Problem List Items Addressed This Visit          Gastrointestinal Abdominal     * (Principal) Acute appendicitis with localized peritonitis - Primary    Relevant Orders    Case request (Completed)        Active Hospital Problems    Diagnosis  POA    **Acute appendicitis with localized peritonitis [K35.30]  Yes      Resolved Hospital Problems   No resolved problems to display.      23-year-old man with history of prior laparoscopic resection of gastric tumor at age is old, presenting with classic " signs and symptoms of acute appendicitis without obvious signs of perforation    P.o. remains IV fluids  IV antibiotics  Multimodal pain control  2 OR for laparoscopic, possible open appendectomy  Risk, benefits, and alternatives were discussed with the patient.  He and his grandmother noted their understanding consent to proceed to the OR.    I discussed the patient's findings and my recommendations with the patient and/or family, as well as the primary team     Yobany Bullard Jr, MD  09/01/23  19:07 CDT        Electronically signed by Yobany Bullard Jr., MD at 09/01/23 1911       Vital Signs (last 7 days) before discharge       Date/Time Temp Temp src Pulse Resp BP Patient Position SpO2    09/02/23 0748 97.8 (36.6) Oral 50 16 105/48 Lying 98    09/02/23 0323 97.6 (36.4) Oral 61 16 102/55 Lying 97    09/02/23 0105 97.4 (36.3) Oral 74 16 118/60 Lying 96    09/02/23 0035 97.5 (36.4) Oral 60 16 116/56 Lying 96    09/01/23 2328 98.1 (36.7) Oral 71 14 112/61 Lying 96    09/01/23 2240 98 (36.7) Oral 90 16 133/69 Lying 98    09/01/23 2225 98.1 (36.7) Temporal 69 14 113/65 -- 93    09/01/23 2215 -- -- 69 16 126/74 -- 95    09/01/23 2205 -- -- 82 20 129/106 -- 99    09/01/23 2155 -- -- 112 16 154/96 -- 99    09/01/23 2145 -- -- 73 16 136/69 -- 98    09/01/23 2140 -- -- 69 18 132/68 -- 99    09/01/23 2135 -- -- 69 14 123/61 -- 98    09/01/23 2130 -- -- 70 12 114/50 -- 98    09/01/23 2129 98 (36.7) Temporal 71 14 114/50 -- 98    09/01/23 1828 97.5 (36.4) Oral 85 18 144/74 -- 100          No current facility-administered medications for this encounter.     Current Outpatient Medications   Medication Sig Dispense Refill    traMADol (ULTRAM) 50 MG tablet Take 1 tablet by mouth Every 6 (Six) Hours As Needed for Moderate Pain for up to 6 days. 12 tablet 0        Operative/Procedure Notes (last 7 days)        Yobany Bullard Jr., MD at 09/01/23 2032              OPERATIVE NOTE    Patient Name:  Farrukh  Simental  YOB: 2000, [id number]    Date of Surgery:  9/1/2023    PREOPERATIVE DIAGNOSIS: Acute appendicitis with localized peritonitis, unspecified whether abscess present, unspecified whether gangrene present, unspecified whether perforation present [K35.30]    POSTOPERATIVE DIAGNOSIS: Same      PROCEDURE PERFORMED:   1] Laparoscopic appendectomy     SURGEON: Yobany Bullard Jr., MD MS    Circulator: Shasha Hodgson RN  Scrub Person: Essie Seymour  Assistant: Rebeca Strong     SPECIMENS: Appendix     ANESTHESIA: General     FINDINGS:   1.  Acute noncomplicated appendicitis without evidence of gangrene or perforation     INDICATIONS: The patient is a 23 y.o. male who presented with 1.5-day duration of vague abdominal pain with migration to the right lower quadrant.  Work-up by a referring physician had found him to have an acute appendicitis with pelvic fluid.  There is concern for potential acute appendicitis with perforation so he is transferred to [facility].  Risk, benefits, and alternatives were discussed with the patient and his grandmother.  They noted their understanding and consent to proceed to the OR for laparoscopic, possible open, appendectomy.     DESCRIPTION OF PROCEDURE:    After obtaining informed consent, the patient was taken to the operating room and placed in supine position the right arm out and left arm tucked. After appropriate DVT and antibiotic prophylaxis, general anesthesia was induced.  A Trinidad was placed in sterilely. The abdomen was prepped and draped in standard sterile fashion.  Timeout was properly performed.    At Bowen's point, local anesthetic was given, followed by a small incision using 11 blade, we then placed a Veress needle successfully and a water drop test was performed and was noted to be satisfactorily.  Then insufflated the abdomen and the patient tolerated the insufflation without issues.    A local anesthetic  infraumbilically followed by incision of the skin with the 11 blade.  Using the Optiview technique, we gained access into the abdominal cavity.  Of note, it was difficult to perform the Optiview technique given the patient's strong fascia.  We viewed the underlying structures and no injuries were noted.  We evaluated Bowen's point and there is no iatrogenic injury with the placement of the Veress needle.  The Veress needle was removed.  Two 5 mm ports were then placed in the left lower quadrant and suprapubic area.  The infraumbilical port was then upsized from a 5 mm port to a 12 mm port.  The patient's right side was then elevated up.    We triangulated to the right lower quadrant.  Found the right colon and traced tinea down to the cecal base which we then found the appendix.  We mobilized the appendix up.  We dissected the base of the appendiceal mesoappendix in order to create a window.  We transected the appendiceal base using a blue 45 mm stapler load.  We then transected the mesoappendix using 2 white 45 mm stapler loads.  The appendix was then placed into an Endo Catch bag and removed through the infraumbilical port site.    We turned our attention back to the stable sites: Remnant appendiceal base was intact without evidence of leak, the remnant mesoappendix was noted to be hemostatic.  We placed the patient in Trendelenburg position.  We evaluated the pelvis colon and had reactionary fluid without any evidence of abscess.  We suctioned out the fluid.    Using the PMI suture passer, we closed the infraumbilical 12 mm port site fascia in a figure-of-eight fashion using 0 Vicryl suture.  The placed patient in supine position.  We removed the 5 mm port under direct laparoscopic vision.  We then desufflated the abdominal cavity.  We irrigated all of the incision sites.  We closed the infraumbilical incision in a 2 layer fashion using interrupted 3-0 Vicryl sutures and running 3-0 Monocryl sutures.  We closed  the 5 mm ports using 3-0 interrupted Monocryl sutures.  Skin glue was placed on top of all of the incision sites.    All sponge and needle counts were correct times two at the completion of the procedure. The patient recovered from anesthesia, was extubated in the operating room and was transported to the PACU in stable condition.    Assistant: Rebeca Strong  was responsible for performing the following activities: Retraction, Suturing, and Closing and their skilled assistance was necessary for the success of this case.    Yobany Bullard Jr, MD  9/1/2023  21:55 CDT        Electronically signed by Yobany Bullard Jr., MD at 09/01/23 0051          Discharge Summary        Yobany Bullard Jr., MD at 09/02/23 5598            Discharge Summary    Patient Name:  Farrukh Simental  YOB: 2000  [id number]    DATE OF ADMISSION: 9/1/2023    DATE OF DISCHARGE: 9/2/2023     FINAL DIAGNOSES:   Patient Active Problem List   Diagnosis    Acute appendicitis with localized peritonitis       CONSULTING SERVICES: None      PROCEDURES PERFORMED:   1.Laparoscopic appendectomy    HISTORY:    Patient is a 23 y.o. male who presents as a transfer from the referring clinic given concern for acute appendicitis.  23-year-old otherwise healthy and very active man presents with about a 1-1/2-day duration of vague abdominal pain with subsequent migration to the right lower quadrant.  He has associated symptoms of nausea, anorexia, some diarrhea, and dysuria.  He was seen by side clinic who obtained labs in which there was reportedly no leukocytosis but CT abdomen pelvis obtained was notable for acute appendicitis with some fluid in the pelvis.  He was then transferred out to [facility] for a laparoscopic appendectomy.     CT abdomen/pelvis personally reviewed and discussed with in-house radiologist: Patient has a acute appendicitis in the right lower quadrant with likely reactive fluid in the  pelvis without evidence of obvious perforation.     HOSPITAL COURSE:   Patient was admitted to the general surgery service.  CD of the CT scan was uploaded and personally reviewed as well with the on-call radiologist: It was confirmed that there is a acute appendicitis with appendix projecting into the right lower pelvis.  No obvious perforation.    He underwent laparoscopic appendectomy on 9/1/2023.  He tolerated procedure well.  He was found to have a nonperforated acute appendicitis.  Postoperatively, patient was doing well.  He tolerated p.o. intake without any nausea or vomiting.  He had adequate pain control.  He was ambulatory and passing flatus.  He was voiding without issues.    He benefited from the hospital stay.  He was deemed medically stable for discharge home.     DISCHARGE MEDICATIONS:   1. All previous home medications.   2. Tramadol 50mg PO  Q6h  PRN pain     DISCHARGE INSTRUCTIONS:  Diet: As tolerated  Pain control: OTC Tylenol and ibuprofen as needed, prescription for tramadol as needed  Shower: Can shower today  Activity: No lifting greater than 10 pounds x 4 weeks, light duties at work for 4 weeks  Call: If Tmax greater than 101.5, worsening right lower quadrant pain, wound erythema    Yobany Bullard Jr, MD  9/2/2023  09:59 CDT    Electronically signed by Yobany Bullard Jr., MD at 09/02/23 1003     Transfer from [place]. IVF infusing. Pt oriented to room.     9/1 prn med dilaudid iv x2

## 2023-09-06 LAB
CYTO UR: NORMAL
LAB AP CASE REPORT: NORMAL
Lab: NORMAL
PATH REPORT.FINAL DX SPEC: NORMAL
PATH REPORT.GROSS SPEC: NORMAL

## (undated) DEVICE — ADHESIVE SKIN CLSR 0.7ML TOP DERMBND ADV

## (undated) DEVICE — Z INACTIVE USE 2660664 SOLUTION IRRIG 3000ML 0.9% SOD CHL USP UROMATIC PLAS CONT

## (undated) DEVICE — SUT VIC 0 UR6 27IN VCP603H

## (undated) DEVICE — STERILE POLYISOPRENE POWDER-FREE SURGICAL GLOVES: Brand: PROTEXIS

## (undated) DEVICE — SUTURE PDS II SZ 1 L27IN ABSRB VLT CT L40MM 1/2 CIR TAPR Z353H

## (undated) DEVICE — SOLUTION IV IRRIG POUR BRL 0.9% SODIUM CHL 2F7124

## (undated) DEVICE — STERILE LATEX POWDER FREE SURGICAL GLOVES WITH HYDROGEL COATING: Brand: PROTEXIS

## (undated) DEVICE — FLUID CONTROL SHOULDER PACK: Brand: CONVERTORS

## (undated) DEVICE — SUTURE MCRYL SZ 3 0 L18IN ABSRB UD PS 2 3 8 CIR REV CUT NDL MCP497G

## (undated) DEVICE — KIT ARTHSCP INSTR INCLUDE CRV SPEAR W/ CIRCUMFERENTIAL

## (undated) DEVICE — T-MAX DISPOSABLE FACE MASK 8 PER BOX

## (undated) DEVICE — CONVERTORS STOCKINETTE: Brand: CONVERTORS

## (undated) DEVICE — 90-S MAX, SUCTION PROBE, NON-BENDABLE, MAX CUT LEVEL 11: Brand: SERFAS ENERGY

## (undated) DEVICE — SUTURE VCRL SZ 0 L27IN ABSRB UD L36MM CT-1 1/2 CIR J260H

## (undated) DEVICE — KT CLN CLEANOR SCPE

## (undated) DEVICE — DRAPE,U/ SHT,SPLIT,PLAS,STERIL: Brand: MEDLINE

## (undated) DEVICE — ENDOPATH PNEUMONEEDLE INSUFFLATION NEEDLES WITH LUER LOCK CONNECTORS 120MM: Brand: ENDOPATH

## (undated) DEVICE — GLOVE SURG SZ 8 L12IN FNGR THK94MIL TRNSLUC YEL LTX HYDRGEL

## (undated) DEVICE — C-ARM: Brand: UNBRANDED

## (undated) DEVICE — MONOPOLAR METZENBAUM SCISSOR, MINI BLADE TIP, DISPOSABLE: Brand: MONOPOLAR METZENBAUM SCISSOR, MINI BLADE TIP, DISPOSABLE

## (undated) DEVICE — SUTURE VCRL SZ 2-0 L36IN ABSRB UD L36MM CT-1 1/2 CIR J945H

## (undated) DEVICE — MICRO SAGITTAL BLADE 90 DEGREE ANGLE OFFSET  (14.3 X 0.38 X 15.9MM)

## (undated) DEVICE — BAPTIST TURNOVER KIT: Brand: MEDLINE INDUSTRIES, INC.

## (undated) DEVICE — BIT DRL L110MM DIA2.5MM G QUIK CPL W/O STP REUSE

## (undated) DEVICE — GLV SURG BIOGEL M LTX PF 7 1/2

## (undated) DEVICE — CUFF SCD HEMOFORCE SEQ CALF STD MD

## (undated) DEVICE — ENDOPATH XCEL WITH OPTIVIEW TECHNOLOGY DILATING TIP TROCARS WITH STABILITY SLEEVES: Brand: ENDOPATH XCEL OPTIVIEW

## (undated) DEVICE — GAUZE,SPONGE,8"X4",12PLY,XRAY,STRL,LF: Brand: MEDLINE

## (undated) DEVICE — IMPLANTABLE DEVICE
Type: IMPLANTABLE DEVICE | Site: SHOULDER | Status: NON-FUNCTIONAL
Removed: 2017-11-21

## (undated) DEVICE — SHOULDER CDS

## (undated) DEVICE — TOTAL TRAY, 16FR 10ML SIL FOLEY, URN: Brand: MEDLINE

## (undated) DEVICE — 4-PORT MANIFOLD: Brand: NEPTUNE 2

## (undated) DEVICE — CLTH CLENS READYCLEANSE PERI CARE PK/5

## (undated) DEVICE — SUTURE FIBERWIRE SZ 2 L38IN NONABSORBABLE BLU WHT BLK AR7201

## (undated) DEVICE — TRAP FLD MINIVAC MEGADYNE 100ML

## (undated) DEVICE — 3M™ STERI-STRIP™ ELASTIC SKIN CLOSURES, E4547, 1/2 IN X 4 IN (12 MM X 100 MM), 6 STRIPS/ENVELOPE: Brand: 3M™ STERI-STRIP™

## (undated) DEVICE — ANTIBACTERIAL UNDYED BRAIDED (POLYGLACTIN 910), SYNTHETIC ABSORBABLE SUTURE: Brand: COATED VICRYL

## (undated) DEVICE — TROCAR SITE CLOSURE DEVICE: Brand: ENDO CLOSE

## (undated) DEVICE — THREE QUARTER SHEET: Brand: CONVERTORS

## (undated) DEVICE — SUTURE PDS + SZ 0 L27IN ABSRB VLT L36MM CT 1 1 2 CIR PDP340H

## (undated) DEVICE — INTEGRATED CASSETTE TUBING, DO NOT USE IF PACKAGE IS DAMAGED: Brand: CROSSFLOW

## (undated) DEVICE — PAD LAP CHOLE: Brand: MEDLINE INDUSTRIES, INC.

## (undated) DEVICE — ENDOPATH XCEL DILATING TIP TROCARS WITH STABILITY SLEEVES: Brand: ENDOPATH XCEL

## (undated) DEVICE — [AGGRESSIVE PLUS CUTTER, ARTHROSCOPIC SHAVER BLADE,  DO NOT RESTERILIZE,  DO NOT USE IF PACKAGE IS DAMAGED,  KEEP DRY,  KEEP AWAY FROM SUNLIGHT]: Brand: FORMULA

## (undated) DEVICE — 2, DISPOSABLE SUCTION/IRRIGATOR WITHOUT DISPOSABLE TIP: Brand: STRYKEFLOW

## (undated) DEVICE — THE ECHELON FLEX POWERED PLUS ARTICULATING ENDOSCOPIC LINEAR CUTTERS ARE STERILE, SINGLE PATIENT USE INSTRUMENTS THAT SIMULTANEOUSLYCUT AND STAPLE TISSUE. THERE ARE SIX STAGGERED ROWS OF STAPLES, THREE ON EITHER SIDE OF THE CUT LINE. THE ECHELON FLEX 45 POWERED PLUSINSTRUMENTS HAVE A STAPLE LINE THAT IS APPROXIMATELY 45 MM LONG AND A CUT LINE THAT IS APPROXIMATELY 42 MM LONG. THE SHAFT CAN ROTATE FREELYIN BOTH DIRECTIONS AND AN ARTICULATION MECHANISM ENABLES THE DISTAL PORTION OF THE SHAFT TO PIVOT TO FACILITATE LATERAL ACCESS TO THE OPERATIVESITE.THE INSTRUMENTS ARE PACKAGED WITH A PRIMARY LITHIUM BATTERY PACK THAT MUST BE INSTALLED PRIOR TO USE. THERE ARE SPECIFIC REQUIREMENTS FORDISPOSING OF THE BATTERY PACK. REFER TO THE BATTERY PACK DISPOSAL SECTION.THE INSTRUMENTS ARE PACKAGED WITHOUT A RELOAD AND MUST BE LOADED PRIOR TO USE. A STAPLE RETAINING CAP ON THE RELOAD PROTECTS THE STAPLE LEGPOINTS DURING SHIPPING AND TRANSPORTATION. THE INSTRUMENTS’ LOCK-OUT FEATURE IS DESIGNED TO PREVENT A USED OR IMPROPERLY INSTALLED RELOADFROM BEING REFIRED OR AN INSTRUMENT FROM BEING FIRED WITHOUT A RELOAD.: Brand: ECHELON FLEX

## (undated) DEVICE — SURGICAL PROCEDURE PACK HIP TOT DBD CDS LOURDES HOSP LTX

## (undated) DEVICE — APPL CHLORAPREP HI/LITE 26ML ORNG

## (undated) DEVICE — NEEDLE SUT PASS FOR ROT CUF LABRAL REP SUREFIRE SCORPION

## (undated) DEVICE — ENDOPATH XCEL WITH OPTIVIEW TECHNOLOGY UNIVERSAL TROCAR STABILITY SLEEVES: Brand: ENDOPATH XCEL OPTIVIEW

## (undated) DEVICE — [AGGRESSIVE 6-FLUTE BARREL BUR, ARTHROSCOPIC SHAVER BLADE,  DO NOT RESTERILIZE,  DO NOT USE IF PACKAGE IS DAMAGED,  KEEP DRY,  KEEP AWAY FROM SUNLIGHT]: Brand: FORMULA